# Patient Record
Sex: FEMALE | Race: BLACK OR AFRICAN AMERICAN | NOT HISPANIC OR LATINO | Employment: OTHER | URBAN - METROPOLITAN AREA
[De-identification: names, ages, dates, MRNs, and addresses within clinical notes are randomized per-mention and may not be internally consistent; named-entity substitution may affect disease eponyms.]

---

## 2017-01-18 PROBLEM — N93.9 ABNORMAL UTERINE BLEEDING: Status: ACTIVE | Noted: 2017-01-18

## 2017-01-18 PROBLEM — K21.9 GASTROESOPHAGEAL REFLUX DISEASE: Status: ACTIVE | Noted: 2017-01-18

## 2017-01-18 PROBLEM — I10 HYPERTENSION: Status: ACTIVE | Noted: 2017-01-18

## 2017-01-18 PROBLEM — E03.9 HYPOACTIVE THYROID: Status: ACTIVE | Noted: 2017-01-18

## 2017-01-18 PROBLEM — E78.5 HYPERLIPIDEMIA: Status: ACTIVE | Noted: 2017-01-18

## 2017-01-18 PROBLEM — F41.9 ANXIETY: Status: ACTIVE | Noted: 2017-01-18

## 2017-01-31 PROBLEM — R73.01 IMPAIRED FASTING GLUCOSE: Status: ACTIVE | Noted: 2017-01-31

## 2017-01-31 PROBLEM — G47.00 INSOMNIA: Status: ACTIVE | Noted: 2017-01-31

## 2017-01-31 PROBLEM — E87.6 HYPOKALEMIA: Status: ACTIVE | Noted: 2017-01-31

## 2017-02-06 PROBLEM — Z01.818 PREOP EXAMINATION: Status: ACTIVE | Noted: 2017-02-06

## 2017-02-06 PROBLEM — R53.1 RIGHT SIDED WEAKNESS: Status: ACTIVE | Noted: 2017-02-06

## 2017-02-07 PROBLEM — Z98.890 STATUS POST D&C: Status: ACTIVE | Noted: 2017-02-07

## 2017-03-03 PROBLEM — N93.9 ABNORMAL UTERINE BLEEDING: Chronic | Status: ACTIVE | Noted: 2017-01-18

## 2017-03-03 PROBLEM — E03.9 HYPOACTIVE THYROID: Chronic | Status: ACTIVE | Noted: 2017-01-18

## 2017-03-03 PROBLEM — R53.1 RIGHT SIDED WEAKNESS: Chronic | Status: ACTIVE | Noted: 2017-02-06

## 2017-03-03 PROBLEM — G47.00 INSOMNIA: Chronic | Status: ACTIVE | Noted: 2017-01-31

## 2017-03-03 PROBLEM — F41.9 ANXIETY: Chronic | Status: ACTIVE | Noted: 2017-01-18

## 2017-03-03 PROBLEM — I10 HYPERTENSION: Chronic | Status: ACTIVE | Noted: 2017-01-18

## 2017-03-03 PROBLEM — K21.9 GASTROESOPHAGEAL REFLUX DISEASE: Chronic | Status: ACTIVE | Noted: 2017-01-18

## 2017-03-03 PROBLEM — E78.5 HYPERLIPIDEMIA: Chronic | Status: ACTIVE | Noted: 2017-01-18

## 2018-10-05 ENCOUNTER — HOSPITAL ENCOUNTER (EMERGENCY)
Facility: HOSPITAL | Age: 63
Discharge: HOME | End: 2018-10-06
Attending: EMERGENCY MEDICINE
Payer: MEDICARE

## 2018-10-05 VITALS
BODY MASS INDEX: 26.3 KG/M2 | HEART RATE: 78 BPM | RESPIRATION RATE: 19 BRPM | WEIGHT: 177.56 LBS | TEMPERATURE: 99 F | OXYGEN SATURATION: 99 % | SYSTOLIC BLOOD PRESSURE: 114 MMHG | DIASTOLIC BLOOD PRESSURE: 56 MMHG | HEIGHT: 69 IN

## 2018-10-05 DIAGNOSIS — R29.810 FACIAL DROOP: Primary | ICD-10-CM

## 2018-10-05 DIAGNOSIS — G24.01 TARDIVE DYSKINESIA: ICD-10-CM

## 2018-10-05 DIAGNOSIS — R53.1 WEAKNESS: ICD-10-CM

## 2018-10-05 DIAGNOSIS — F25.9 SCHIZOAFFECTIVE DISORDER, UNSPECIFIED TYPE: Chronic | ICD-10-CM

## 2018-10-05 LAB
ALBUMIN SERPL BCP-MCNC: 4.1 G/DL
ALP SERPL-CCNC: 86 U/L
ALT SERPL W/O P-5'-P-CCNC: 13 U/L
ANION GAP SERPL CALC-SCNC: 13 MMOL/L
AST SERPL-CCNC: 26 U/L
BASOPHILS # BLD AUTO: 0.01 K/UL
BASOPHILS NFR BLD: 0.2 %
BILIRUB SERPL-MCNC: 0.6 MG/DL
BILIRUB UR QL STRIP: NEGATIVE
BUN SERPL-MCNC: 18 MG/DL
CALCIUM SERPL-MCNC: 10.4 MG/DL
CHLORIDE SERPL-SCNC: 106 MMOL/L
CLARITY UR: CLEAR
CO2 SERPL-SCNC: 21 MMOL/L
COLOR UR: YELLOW
CREAT SERPL-MCNC: 1.2 MG/DL
DIFFERENTIAL METHOD: ABNORMAL
EOSINOPHIL # BLD AUTO: 0.1 K/UL
EOSINOPHIL NFR BLD: 2.6 %
ERYTHROCYTE [DISTWIDTH] IN BLOOD BY AUTOMATED COUNT: 13 %
EST. GFR  (AFRICAN AMERICAN): 56 ML/MIN/1.73 M^2
EST. GFR  (NON AFRICAN AMERICAN): 49 ML/MIN/1.73 M^2
GLUCOSE SERPL-MCNC: 121 MG/DL
GLUCOSE UR QL STRIP: NEGATIVE
HCT VFR BLD AUTO: 37.4 %
HGB BLD-MCNC: 12.9 G/DL
HGB UR QL STRIP: NEGATIVE
KETONES UR QL STRIP: NEGATIVE
LEUKOCYTE ESTERASE UR QL STRIP: NEGATIVE
LYMPHOCYTES # BLD AUTO: 2.3 K/UL
LYMPHOCYTES NFR BLD: 49.3 %
MCH RBC QN AUTO: 32.2 PG
MCHC RBC AUTO-ENTMCNC: 34.5 G/DL
MCV RBC AUTO: 93 FL
MONOCYTES # BLD AUTO: 0.3 K/UL
MONOCYTES NFR BLD: 7.2 %
NEUTROPHILS # BLD AUTO: 1.9 K/UL
NEUTROPHILS NFR BLD: 40.7 %
NITRITE UR QL STRIP: NEGATIVE
PH UR STRIP: 6 [PH] (ref 5–8)
PLATELET # BLD AUTO: 183 K/UL
PMV BLD AUTO: 10.3 FL
POTASSIUM SERPL-SCNC: 3.7 MMOL/L
PROT SERPL-MCNC: 8.3 G/DL
PROT UR QL STRIP: NEGATIVE
RBC # BLD AUTO: 4.01 M/UL
SODIUM SERPL-SCNC: 140 MMOL/L
SP GR UR STRIP: 1.02 (ref 1–1.03)
TROPONIN I SERPL DL<=0.01 NG/ML-MCNC: <0.006 NG/ML
URN SPEC COLLECT METH UR: NORMAL
UROBILINOGEN UR STRIP-ACNC: NEGATIVE EU/DL
WBC # BLD AUTO: 4.69 K/UL

## 2018-10-05 PROCEDURE — 96375 TX/PRO/DX INJ NEW DRUG ADDON: CPT | Mod: 59

## 2018-10-05 PROCEDURE — 85610 PROTHROMBIN TIME: CPT

## 2018-10-05 PROCEDURE — 96374 THER/PROPH/DIAG INJ IV PUSH: CPT | Mod: 59

## 2018-10-05 PROCEDURE — 93010 ELECTROCARDIOGRAM REPORT: CPT | Mod: ,,, | Performed by: INTERNAL MEDICINE

## 2018-10-05 PROCEDURE — 84484 ASSAY OF TROPONIN QUANT: CPT

## 2018-10-05 PROCEDURE — 81003 URINALYSIS AUTO W/O SCOPE: CPT

## 2018-10-05 PROCEDURE — 63600175 PHARM REV CODE 636 W HCPCS: Performed by: EMERGENCY MEDICINE

## 2018-10-05 PROCEDURE — P9612 CATHETERIZE FOR URINE SPEC: HCPCS

## 2018-10-05 PROCEDURE — 93005 ELECTROCARDIOGRAM TRACING: CPT

## 2018-10-05 PROCEDURE — 85025 COMPLETE CBC W/AUTO DIFF WBC: CPT

## 2018-10-05 PROCEDURE — 99284 EMERGENCY DEPT VISIT MOD MDM: CPT | Mod: 25

## 2018-10-05 PROCEDURE — 25500020 PHARM REV CODE 255: Performed by: EMERGENCY MEDICINE

## 2018-10-05 PROCEDURE — 80053 COMPREHEN METABOLIC PANEL: CPT

## 2018-10-05 RX ORDER — DIPHENHYDRAMINE HYDROCHLORIDE 50 MG/ML
25 INJECTION INTRAMUSCULAR; INTRAVENOUS
Status: COMPLETED | OUTPATIENT
Start: 2018-10-05 | End: 2018-10-05

## 2018-10-05 RX ORDER — METHYLPREDNISOLONE SOD SUCC 125 MG
125 VIAL (EA) INJECTION
Status: COMPLETED | OUTPATIENT
Start: 2018-10-05 | End: 2018-10-05

## 2018-10-05 RX ADMIN — METHYLPREDNISOLONE SODIUM SUCCINATE 125 MG: 125 INJECTION, POWDER, FOR SOLUTION INTRAMUSCULAR; INTRAVENOUS at 10:10

## 2018-10-05 RX ADMIN — DIPHENHYDRAMINE HYDROCHLORIDE 25 MG: 50 INJECTION, SOLUTION INTRAMUSCULAR; INTRAVENOUS at 09:10

## 2018-10-05 RX ADMIN — IOHEXOL 100 ML: 350 INJECTION, SOLUTION INTRAVENOUS at 11:10

## 2018-10-06 LAB
INR PPP: 1
PROTHROMBIN TIME: 10.3 SEC

## 2018-10-06 RX ORDER — BENZTROPINE MESYLATE 1 MG/1
1 TABLET ORAL EVERY 6 HOURS
Qty: 20 TABLET | Refills: 0 | Status: SHIPPED | OUTPATIENT
Start: 2018-10-06 | End: 2018-10-11

## 2018-10-06 NOTE — ED PROVIDER NOTES
SCRIBE #1 NOTE: I, Celeste Castellanos, am scribing for, and in the presence of, Reyes Khalil Jr., MD. I have scribed the entire note.         History     Chief Complaint   Patient presents with    Drooling     pt's daughter reports she started having drooling and difficultly speaking since yesterday and today noticed a facial droop       Review of patient's allergies indicates:   Allergen Reactions    Odom Edema     States causes eyes to swell.    Shellfish containing products Hives       History of Present Illness   HPI    10/5/2018, 8:54 PM  History obtained from the patient and daughter      History of Present Illness: Yvonne Garcia is a 63 y.o. female patient with a PMHx including stroke, DM, HTN, nervous breakdown who presents to the Emergency Department for evaluation of drooling which onset suddenly yesterday. Pt's daughter states pt has been laying in bed all night yesterday to today and noticed a facial droop today. Pt had a stoke that caused L sided weakness and daughter report it has worsened today. Daughter also reports pt's increased tremors. Symptoms are constant and moderate in severity.  No mitigating or exacerbating factors reported. Patient denies N/V/D, fever, chills, diaphoresis, dizziness, HA , and all other sxs at this time. No tx PTA. No further complaints or concerns at this time.          Arrival mode: Personal vehicle      PCP: Risa Jordan MD        Past Medical History:  Past Medical History:   Diagnosis Date    Diabetes mellitus     Encounter for blood transfusion     Headache(784.0)     Hypertension     Liver disease     Memory loss     Nervous breakdown     Other and unspecified hyperlipidemia     Sick sinus syndrome     Stroke     left side weakness    Syncope and collapse        Past Surgical History:  Past Surgical History:   Procedure Laterality Date    CHOLECYSTECTOMY      [id number] N/A 2/7/2017    Performed by Lavinia Richards MD  at [facility] OR    LEG SURGERY Left     pacemaker      TUBAL LIGATION           Family History:  Family History   Problem Relation Age of Onset    No Known Problems Mother     No Known Problems Father        Social History:  Social History     Tobacco Use    Smoking status: Never Smoker   Substance and Sexual Activity    Alcohol use: No    Drug use: No    Sexual activity: No     Birth control/protection: Surgical        Review of Systems   Review of Systems   Constitutional: Negative for activity change, appetite change, chills, diaphoresis, fatigue and fever.   HENT: Positive for drooling. Negative for congestion, ear pain, nosebleeds, rhinorrhea, sinus pain, sore throat and trouble swallowing.    Eyes: Negative for pain and discharge.   Respiratory: Negative for cough, chest tightness, shortness of breath, wheezing and stridor.    Cardiovascular: Negative for chest pain, palpitations and leg swelling.   Gastrointestinal: Negative for abdominal distention, abdominal pain, blood in stool, constipation, diarrhea, nausea and vomiting.   Genitourinary: Negative for difficulty urinating, dysuria, flank pain, frequency, hematuria and urgency.   Musculoskeletal: Negative for arthralgias, back pain, myalgias and neck pain.   Skin: Negative for pallor, rash and wound.   Neurological: Positive for tremors, facial asymmetry (facial droop) and weakness (L-sided). Negative for dizziness, syncope, light-headedness, numbness and headaches.   Hematological: Does not bruise/bleed easily.   Psychiatric/Behavioral: Negative for confusion and self-injury.   All other systems reviewed and are negative.     Physical Exam     Initial Vitals [10/05/18 2032]   BP Pulse Resp Temp SpO2   107/67 91 20 98.9 °F (37.2 °C) 100 %      MAP       --          Physical Exam  Nursing Notes and Vital Signs Reviewed.  Constitutional: Patient is in no acute distress. Well-developed and well-nourished.  Head: Atraumatic. Normocephalic. L-sided facial  "droop.   Eyes: PERRL. EOM intact. Conjunctivae are not pale. No scleral icterus.  ENT: Mucous membranes are moist. Oropharynx is clear and symmetric.    Neck: Supple. Full ROM. No lymphadenopathy.  Cardiovascular: Regular rate. Regular rhythm. No murmurs, rubs, or gallops. Distal pulses are 2+ and symmetric.  Pulmonary/Chest: No respiratory distress. Clear to auscultation bilaterally. No wheezing or rales.  Abdominal: Soft and non-distended.  There is no tenderness.  No rebound, guarding, or rigidity. Good bowel sounds.  Genitourinary: No CVA tenderness  Musculoskeletal: Moves all extremities. No obvious deformities. No edema. No calf tenderness.   Skin: Warm and dry.  Neurological:  Alert, awake, and appropriate. Tremors noted.  Slurred speech. Baseline akathisia. No acute focal neurological deficits are appreciated. Bilateral lower extremity weakness.  Psychiatric: Normal affect. Good eye contact. Appropriate in content.       ED Course   Procedures  ED Vital Signs:  Vitals:    10/05/18 2032 10/05/18 2100 10/05/18 2244   BP: 107/67  (!) 114/56   Pulse: 91 79 78   Resp: 20  19   Temp: 98.9 °F (37.2 °C)     TempSrc: Oral     SpO2: 100%  99%   Weight: 80.6 kg (177 lb 9.3 oz)     Height: 5' 9" (1.753 m)         Abnormal Lab Results:  Labs Reviewed   CBC W/ AUTO DIFFERENTIAL - Abnormal; Notable for the following components:       Result Value    MCH 32.2 (*)     Lymph% 49.3 (*)     All other components within normal limits   COMPREHENSIVE METABOLIC PANEL - Abnormal; Notable for the following components:    CO2 21 (*)     Glucose 121 (*)     eGFR if  56 (*)     eGFR if non  49 (*)     All other components within normal limits   PROTIME-INR   URINALYSIS   TROPONIN I        All Lab Results:  Results for orders placed or performed during the hospital encounter of 10/05/18   CBC auto differential   Result Value Ref Range    WBC 4.69 3.90 - 12.70 K/uL    RBC 4.01 4.00 - 5.40 M/uL    Hemoglobin " 12.9 12.0 - 16.0 g/dL    Hematocrit 37.4 37.0 - 48.5 %    MCV 93 82 - 98 fL    MCH 32.2 (H) 27.0 - 31.0 pg    MCHC 34.5 32.0 - 36.0 g/dL    RDW 13.0 11.5 - 14.5 %    Platelets 183 150 - 350 K/uL    MPV 10.3 9.2 - 12.9 fL    Gran # (ANC) 1.9 1.8 - 7.7 K/uL    Lymph # 2.3 1.0 - 4.8 K/uL    Mono # 0.3 0.3 - 1.0 K/uL    Eos # 0.1 0.0 - 0.5 K/uL    Baso # 0.01 0.00 - 0.20 K/uL    Gran% 40.7 38.0 - 73.0 %    Lymph% 49.3 (H) 18.0 - 48.0 %    Mono% 7.2 4.0 - 15.0 %    Eosinophil% 2.6 0.0 - 8.0 %    Basophil% 0.2 0.0 - 1.9 %    Differential Method Automated    Comprehensive metabolic panel   Result Value Ref Range    Sodium 140 136 - 145 mmol/L    Potassium 3.7 3.5 - 5.1 mmol/L    Chloride 106 95 - 110 mmol/L    CO2 21 (L) 23 - 29 mmol/L    Glucose 121 (H) 70 - 110 mg/dL    BUN, Bld 18 8 - 23 mg/dL    Creatinine 1.2 0.5 - 1.4 mg/dL    Calcium 10.4 8.7 - 10.5 mg/dL    Total Protein 8.3 6.0 - 8.4 g/dL    Albumin 4.1 3.5 - 5.2 g/dL    Total Bilirubin 0.6 0.1 - 1.0 mg/dL    Alkaline Phosphatase 86 55 - 135 U/L    AST 26 10 - 40 U/L    ALT 13 10 - 44 U/L    Anion Gap 13 8 - 16 mmol/L    eGFR if African American 56 (A) >60 mL/min/1.73 m^2    eGFR if non African American 49 (A) >60 mL/min/1.73 m^2   Protime-INR   Result Value Ref Range    Prothrombin Time 10.3 9.0 - 12.5 sec    INR 1.0 0.8 - 1.2   Urinalysis   Result Value Ref Range    Specimen UA Urine, Catheterized     Color, UA Yellow Yellow, Straw, Isabel    Appearance, UA Clear Clear    pH, UA 6.0 5.0 - 8.0    Specific Gravity, UA 1.020 1.005 - 1.030    Protein, UA Negative Negative    Glucose, UA Negative Negative    Ketones, UA Negative Negative    Bilirubin (UA) Negative Negative    Occult Blood UA Negative Negative    Nitrite, UA Negative Negative    Urobilinogen, UA Negative <2.0 EU/dL    Leukocytes, UA Negative Negative   Troponin I   Result Value Ref Range    Troponin I <0.006 0.000 - 0.026 ng/mL             Imaging Results:  Imaging Results          CTA Head and Neck  (xpd) (Final result)  Result time 10/06/18 00:20:53    Final result by Ever Garcia MD (10/06/18 00:20:53)                 Impression:      Normal CTA head and neck.    All CT scans at this facility are performed  using dose modulation techniques as appropriate to performed exam including the following:  automated exposure control; adjustment of mA and/or kV according to the patients size (this includes techniques or standardized protocols for targeted exams where dose is matched to indication/reason for exam: i.e. extremities or head);  iterative reconstruction technique.      Electronically signed by: Ever Garcia MD  Date:    10/06/2018  Time:    00:20             Narrative:    EXAMINATION:  CTA HEAD AND NECK (XPD)    CLINICAL HISTORY:  Decreased alertness;Neuro deficit(s), subacute;    TECHNIQUE:  CT angiogram head and neck performed with multiplanar macular the projections of following intravenous contrast.    COMPARISON:  None    FINDINGS:  CTA head.  The anterior and posterior circulation opacifies normally.  There is no evidence of a large vessel occlusion.  No high grade stenosis or irregularity.  No aneurysm or AVM.    CTA neck.  Normal aortic arch.  The great vessels are unremarkable.  Normal enhancement bilateral common carotid arteries, external carotid arteries, and internal carotid arteries.  No atherosclerotic plaque.  No occlusion.  No stenosis.  Normal vertebral arteries.                               CT Head Without Contrast (Final result)  Result time 10/05/18 21:29:59    Final result by Ever Garcia MD (10/05/18 21:29:59)                 Impression:      Normal head CT.    All CT scans at this facility are performed  using dose modulation techniques as appropriate to performed exam including the following:  automated exposure control; adjustment of mA and/or kV according to the patients size (this includes techniques or standardized protocols for targeted exams where dose is matched to  indication/reason for exam: i.e. extremities or head);  iterative reconstruction technique.      Electronically signed by: Ever Garcia MD  Date:    10/05/2018  Time:    21:29             Narrative:    EXAMINATION:  CT HEAD WITHOUT CONTRAST    CLINICAL HISTORY:  Focal neuro deficit, new, fixed or worsening, >6 hours;    TECHNIQUE:  Routine noncontrast head CT.    COMPARISON:  02/06/2017.    FINDINGS:  No change.  There is no acute intracranial hemorrhage or abnormal extra-axial fluid collection. There is no abnormal increased or decreased density within the brain parenchyma. Gray-white differentiation preserved.  Normal ventricles. There is no intracranial mass or mass effect. The calvarium is intact. Visualized paranasal sinuses and mastoids are well aerated.                                 The EKG was ordered, reviewed, and independently interpreted by the ED provider.  Interpretation time: 21:00   Rate: 79 BPM  Rhythm: normal sinus rhythm  Interpretation: Nonspecific ST and T wave abnormality. No STEMI.         The Emergency Provider reviewed the vital signs and test results, which are outlined above.     ED Discussion     10:06 PM: Re-evaluated pt. Pt is resting comfortably and is in no acute distress.  D/w pt all pertinent results. D/w pt any concerns expressed at this time. Answered all questions. Pt expresses understanding at this time.    12:53 AM: Reassessed pt at this time.  Pt states her condition has improved at this time. Discussed with pt all pertinent ED information and results. Discussed pt dx and plan of tx. Gave pt and daughter all f/u and return to the ED instructions. All questions and concerns were addressed at this time. Pt and daughter express understanding of information and instructions, and is comfortable with plan to discharge. Pt is stable for discharge.      ED Medication(s):  Medications   diphenhydrAMINE injection 25 mg (25 mg Intravenous Given 10/5/18 2100)   methylPREDNISolone sodium  succinate injection 125 mg (125 mg Intravenous Given 10/5/18 9922)   omnipaque 350 iohexol 100 mL (100 mLs Intravenous Given 10/5/18 8513)     Current Discharge Medication List   none       Follow-up Information     Risa Jordan MD. Call in 3 days.    Specialty:  Family Medicine  Why:  to schedule appt for recheck  Contact information:  [address]  [facility] [address] [phone]             [facility]    Specialty:  Emergency Medicine  Why:  As needed  Contact information:  [address]  [facility] [address] [phone]                    Medical Decision Making     Medical Decision Making:   Clinical Tests:   Lab Tests: Ordered and Reviewed  Radiological Study: Ordered and Reviewed  Medical Tests: Ordered and Reviewed             Scribe Attestation:   Scribe #1: I performed the above scribed service and the documentation accurately describes the services I performed. I attest to the accuracy of the note. 10/06/2018    Attending:   Physician Attestation Statement for Scribe #1: I, Reyes Khalil Jr., MD, personally performed the services described in this documentation, as scribed by Celeste Castellanos, in my presence, and it is both accurate and complete.           Clinical Impression       ICD-10-CM ICD-9-CM   1. Facial droop R29.810 781.94   2. Weakness R53.1 780.79   3. Schizoaffective disorder, unspecified type F25.9 295.70   4. Tardive dyskinesia G24.01 333.85       Disposition:   Disposition: Discharged  Condition: Stable               Reyes Khalil Jr., MD  10/06/18 0204

## 2018-10-06 NOTE — ED NOTES
In-and-out catheterization was performed to obtain sterile specimen and bladder was drained. Hibiclens was used in replacement of iodine due to allergies.